# Patient Record
Sex: MALE | Race: OTHER | HISPANIC OR LATINO | Employment: FULL TIME | URBAN - METROPOLITAN AREA
[De-identification: names, ages, dates, MRNs, and addresses within clinical notes are randomized per-mention and may not be internally consistent; named-entity substitution may affect disease eponyms.]

---

## 2024-01-23 ENCOUNTER — OFFICE VISIT (OUTPATIENT)
Dept: URGENT CARE | Facility: CLINIC | Age: 44
End: 2024-01-23
Payer: COMMERCIAL

## 2024-01-23 VITALS
DIASTOLIC BLOOD PRESSURE: 89 MMHG | TEMPERATURE: 100 F | SYSTOLIC BLOOD PRESSURE: 128 MMHG | HEART RATE: 101 BPM | BODY MASS INDEX: 31.5 KG/M2 | HEIGHT: 71 IN | OXYGEN SATURATION: 97 % | WEIGHT: 225 LBS | RESPIRATION RATE: 20 BRPM

## 2024-01-23 DIAGNOSIS — J10.1 INFLUENZA B: ICD-10-CM

## 2024-01-23 DIAGNOSIS — R52 BODY ACHES: Primary | ICD-10-CM

## 2024-01-23 LAB
CTP QC/QA: YES
CTP QC/QA: YES
POC MOLECULAR INFLUENZA A AGN: NEGATIVE
POC MOLECULAR INFLUENZA B AGN: POSITIVE
SARS-COV-2 RDRP RESP QL NAA+PROBE: NEGATIVE

## 2024-01-23 PROCEDURE — 87502 INFLUENZA DNA AMP PROBE: CPT | Mod: QW,,, | Performed by: NURSE PRACTITIONER

## 2024-01-23 PROCEDURE — 99203 OFFICE O/P NEW LOW 30 MIN: CPT | Mod: ,,, | Performed by: NURSE PRACTITIONER

## 2024-01-23 PROCEDURE — 87635 SARS-COV-2 COVID-19 AMP PRB: CPT | Mod: QW,,, | Performed by: NURSE PRACTITIONER

## 2024-01-23 RX ORDER — OSELTAMIVIR PHOSPHATE 75 MG/1
75 CAPSULE ORAL 2 TIMES DAILY
Qty: 10 CAPSULE | Refills: 0 | Status: SHIPPED | OUTPATIENT
Start: 2024-01-23 | End: 2024-01-28

## 2024-01-23 RX ORDER — LISINOPRIL 10 MG/1
10 TABLET ORAL
COMMUNITY
Start: 2023-11-27

## 2024-01-23 NOTE — PATIENT INSTRUCTIONS
-Rest and stay hydrated.  Take Tamiflu prescription medication for flu as directed  -Tylenol every 4 hours OR ibuprofen every 6 hours as needed for pain/fever.  -Flonase OTC or Nasacort OTC for nasal congestion.  -Warm face compresses to help with facial sinus pain/pressure.  -Simple foods like chicken noodle soup.  -Delsym helps with coughing at night  -Zyrtec/Claritin during the day & Benadryl at night may help with allergies.  Please follow up with your primary care provider within 2-5 days if your signs and symptoms have not resolved or worsen.   If your condition worsens or fails to improve we recommend that you receive another evaluation at the emergency room immediately or contact your primary medical clinic to discuss your concerns.

## 2024-01-23 NOTE — PROGRESS NOTES
"Subjective:      Patient ID: Sg Montoya is a 43 y.o. male.    Vitals:  height is 5' 11" (1.803 m) and weight is 102.1 kg (225 lb). His temperature is 99.5 °F (37.5 °C). His blood pressure is 128/89 and his pulse is 101. His respiration is 20 and oxygen saturation is 97%.     Chief Complaint: Cough (Pt presents to clinic with c/o cough, fever, chills, body aches fatigue, and nasal congestion starting Sunday. Pt has tried dayquil- mod relief. )    43-year-old male presents with sinus congestion, fever, cough, and body aches.  Onset 2 days ago.  No shortness of breath    Cough  Associated symptoms include a fever.       Constitution: Positive for fatigue and fever.   HENT:  Positive for congestion and sinus pressure.    Respiratory:  Positive for cough.       Objective:     Physical Exam   Constitutional: He is oriented to person, place, and time. He appears well-developed. He is cooperative.  Non-toxic appearance. He does not appear ill. No distress.   HENT:   Head: Normocephalic and atraumatic.   Ears:   Right Ear: Hearing, tympanic membrane, external ear and ear canal normal.   Left Ear: Hearing, tympanic membrane, external ear and ear canal normal.   Nose: Nose normal. No mucosal edema, rhinorrhea or nasal deformity. No epistaxis. Right sinus exhibits no maxillary sinus tenderness and no frontal sinus tenderness. Left sinus exhibits no maxillary sinus tenderness and no frontal sinus tenderness.   Mouth/Throat: Uvula is midline, oropharynx is clear and moist and mucous membranes are normal. No trismus in the jaw. Normal dentition. No uvula swelling. No oropharyngeal exudate, posterior oropharyngeal edema or posterior oropharyngeal erythema.   Eyes: Conjunctivae and lids are normal. No scleral icterus.   Neck: Trachea normal and phonation normal. Neck supple. No edema present. No erythema present. No neck rigidity present.   Cardiovascular: Normal rate, regular rhythm, normal heart sounds and normal pulses. "   Pulmonary/Chest: Effort normal and breath sounds normal. No respiratory distress. He has no decreased breath sounds. He has no rhonchi.   Abdominal: Normal appearance.   Musculoskeletal: Normal range of motion.         General: No deformity. Normal range of motion.   Neurological: He is alert and oriented to person, place, and time. He exhibits normal muscle tone. Coordination normal.   Skin: Skin is warm, dry, intact, not diaphoretic and not pale.   Psychiatric: His speech is normal and behavior is normal. Judgment and thought content normal.   Nursing note and vitals reviewed.      Assessment:     1. Body aches    2. Influenza B        Plan:   -Rest and stay hydrated.  Take Tamiflu prescription medication for flu as directed  -Tylenol every 4 hours OR ibuprofen every 6 hours as needed for pain/fever.  -Flonase OTC or Nasacort OTC for nasal congestion.  -Warm face compresses to help with facial sinus pain/pressure.  -Simple foods like chicken noodle soup.  -Delsym helps with coughing at night  -Zyrtec/Claritin during the day & Benadryl at night may help with allergies.  Please follow up with your primary care provider within 2-5 days if your signs and symptoms have not resolved or worsen.   If your condition worsens or fails to improve we recommend that you receive another evaluation at the emergency room immediately or contact your primary medical clinic to discuss your concerns.          Body aches  -     POCT COVID-19 Rapid Screening  -     POCT Influenza A/B Molecular  -     Cancel: POCT Strep A, Molecular    Influenza B  -     oseltamivir (TAMIFLU) 75 MG capsule; Take 1 capsule (75 mg total) by mouth 2 (two) times daily. for 5 days  Dispense: 10 capsule; Refill: 0

## 2024-01-23 NOTE — LETTER
January 23, 2024      [facility]  [address], [address]  [place] [address]  Phone: [phone]  Fax: [phone]       Patient: Sg Montoya   YOB: 1980  Date of Visit: 01/23/2024    To Whom It May Concern:    Felix Montoya  was at [facility] on 01/23/2024. The patient may return to work/school on 01/29/2024 with no restrictions. If you have any questions or concerns, or if I can be of further assistance, please do not hesitate to contact me.    Sincerely,    Maverick Wilson NP